# Patient Record
Sex: FEMALE | Race: BLACK OR AFRICAN AMERICAN | Employment: OTHER | URBAN - METROPOLITAN AREA
[De-identification: names, ages, dates, MRNs, and addresses within clinical notes are randomized per-mention and may not be internally consistent; named-entity substitution may affect disease eponyms.]

---

## 2017-06-26 ENCOUNTER — OFFICE VISIT (OUTPATIENT)
Dept: ORTHOPEDIC SURGERY | Age: 42
End: 2017-06-26

## 2017-06-26 VITALS
WEIGHT: 171 LBS | HEART RATE: 101 BPM | SYSTOLIC BLOOD PRESSURE: 120 MMHG | BODY MASS INDEX: 32.28 KG/M2 | HEIGHT: 61 IN | DIASTOLIC BLOOD PRESSURE: 67 MMHG

## 2017-06-26 RX ORDER — TRIAMCINOLONE ACETONIDE 40 MG/ML
60 INJECTION, SUSPENSION INTRA-ARTICULAR; INTRAMUSCULAR ONCE
Qty: 2 ML | Refills: 0 | Status: CANCELLED
Start: 2017-06-26 | End: 2017-06-26

## 2017-06-26 RX ORDER — LIDOCAINE HYDROCHLORIDE 10 MG/ML
4 INJECTION INFILTRATION; PERINEURAL ONCE
Qty: 6 ML | Refills: 0 | Status: CANCELLED
Start: 2017-06-26 | End: 2017-06-26

## 2017-06-26 NOTE — PROGRESS NOTES
Patient: Jannetta Dancer                MRN: [id number]       SSN: [id number]  YOB: 1975        AGE: 46 y.o. SEX: female  There is no height or weight on file to calculate BMI. PCP: Delmis Leo NP  06/26/17      No chief complaint on file. HISTORY OF PRESENT ILLNESS: Jannetta Dancer is a 46 y.o. female who presents to the office for ***. Review of Systems   Constitutional: Negative. HENT: Negative. Eyes: Negative. Respiratory: Negative. Cardiovascular: Negative. Gastrointestinal: Negative. Genitourinary: Negative. Musculoskeletal: Positive for joint pain (left shoulder). Skin: Negative. Neurological: Negative. Endo/Heme/Allergies: Negative. Psychiatric/Behavioral: Negative.             Social History     Social History    Marital status: SINGLE     Spouse name: N/A    Number of children: N/A    Years of education: N/A     Occupational History          Social History Main Topics    Smoking status: Current Every Day Smoker     Packs/day: 1.00     Years: 15.00     Types: Cigarettes     Start date: 1/1/1992    Smokeless tobacco: Never Used    Alcohol use 1.8 - 2.4 oz/week     3 - 4 Glasses of wine per week      Comment: 3 to 5 glasses wine weekly    Drug use: No    Sexual activity: Yes     Partners: Female     Other Topics Concern     Service No    Blood Transfusions No    Caffeine Concern No    Occupational Exposure Yes        [facility] Hazards No    Sleep Concern Yes     has not been able to sleep lately, reports 3 hrs of sleep at night    Stress Concern Yes    Weight Concern Yes     has recently gained weight    Special Diet No    Back Care No    Exercise No    Seat Belt Yes    Self-Exams Yes     Social History Narrative        Past Medical History:   Diagnosis Date    Anemia     Asthma     Asthma     Bilateral ovarian cysts     Depression     HA (headache)     Heart murmur     Hypertension  MS (multiple sclerosis) (HCC)     Multiple sclerosis (HCC)     Wheezing         Allergies   Allergen Reactions    Penicillins Swelling     Other reaction(s): anaphylaxis/angioedema, wheezing/sob         Current Outpatient Prescriptions   Medication Sig    ibuprofen (MOTRIN) 200 mg tablet Take  by mouth.  albuterol (PROVENTIL HFA, VENTOLIN HFA, PROAIR HFA) 90 mcg/actuation inhaler Take  by inhalation.  diclofenac (VOLTAREN) 1 % gel Apply  to affected area four (4) times daily.  METHYL SALICYLATE/MENTHOL (BENGAY EX) by Apply Externally route.  cyclobenzaprine (FLEXERIL) 10 mg tablet Take 1 Tab by mouth three (3) times daily as needed for Muscle Spasm(s).  ALPRAZolam (XANAX) 0.25 mg tablet Take 1 Tab by mouth two (2) times daily as needed for Anxiety. Max Daily Amount: 0.5 mg. Indications: ANXIETY WITH DEPRESSION, PANIC DISORDER    NAPROXEN SODIUM (ALEVE PO) Take  by mouth.  gabapentin (NEURONTIN) 100 mg capsule Take 1 Cap by mouth three (3) times daily.  carBAMazepine (TEGRETOL) 100 mg chewable tablet     busPIRone (BUSPAR) 10 mg tablet Take 1 Tab by mouth two (2) times a day. Indications: GENERALIZED ANXIETY DISORDER    traMADol (ULTRAM) 50 mg tablet Take 1 Tab by mouth every six (6) hours as needed for Pain. Max Daily Amount: 200 mg.  sertraline (ZOLOFT) 100 mg tablet Take 2 Tabs by mouth daily. Indications: ANXIETY WITH DEPRESSION    albuterol (ACCUNEB) 1.25 mg/3 mL nebulizer solution 1.25 mg.    natalizumab (TYSABRI) 300 mg/15 mL injection 300 mg by IntraVENous route once.  VENTOLIN HFA 90 mcg/actuation inhaler     QVAR 80 mcg/actuation inhaler     ergocalciferol (ERGOCALCIFEROL) 50,000 unit capsule 50,000 Units every seven (7) days. Indications: VITAMIN D DEFICIENCY     No current facility-administered medications for this visit. Physical Exam  Constitutional: she is oriented to person, place, and time and well-developed, well-nourished, and in no distress.  No distress. HENT:   Head: Normocephalic and atraumatic. Right Ear: Hearing normal.   Left Ear: Hearing normal.   Nose: Nose normal.   Eyes: Conjunctivae, EOM and lids are normal. Pupils are equal, round, and reactive to light. Neck: Trachea normal.   Pulmonary/Chest: Effort normal and breath sounds normal. No respiratory distress. Abdominal: Soft. Neurological: she is alert and oriented to person, place, and time. Skin: Skin is warm, dry and intact. she is not diaphoretic. Psychiatric: Affect normal.   Nursing note and vitals reviewed. Ortho Exam   ***    Procedure: After timeout and under sterile conditions, patient's left shoulder was injected with 4 cc of Xylocaine and 1.5 cc of Kenalog. [facility] - [address] [place] [address]  OFFICE PROCEDURE PROGRESS NOTE        Chart reviewed for the following:   Demetris Thomas MD, have reviewed the History, Physical and updated the Allergic reactions for [address] performed immediately prior to start of procedure:   Demetris Thomas MD, have performed the following reviews on Lexi Nunez prior to the start of the procedure:            * Patient was identified by name and date of birth   * Agreement on procedure being performed was verified  * Risks and Benefits explained to the patient  * Procedure site verified and marked as necessary  * Patient was positioned for comfort  * Consent was signed and verified     Time: 2:20 PM        Date of procedure: 6/26/2017    Procedure performed by: Camilo Bahena MD    Provider assisted by: None     Patient assisted by: self    How tolerated by patient: tolerated the procedure well with no complications    Comments: none      RADIOGRAPHS:   ***    IMPRESSION & PLAN: ***. I will see her back ***. Written by Rody Pace, as dictated by Dr. Deisi Cota, Dr. Camilo Bahena, confirm that all documentation is accurate.

## 2017-07-26 ENCOUNTER — OFFICE VISIT (OUTPATIENT)
Dept: ORTHOPEDIC SURGERY | Age: 42
End: 2017-07-26

## 2017-07-26 VITALS
BODY MASS INDEX: 32.28 KG/M2 | TEMPERATURE: 98.6 F | SYSTOLIC BLOOD PRESSURE: 129 MMHG | WEIGHT: 171 LBS | DIASTOLIC BLOOD PRESSURE: 94 MMHG | HEIGHT: 61 IN | HEART RATE: 84 BPM | RESPIRATION RATE: 18 BRPM | OXYGEN SATURATION: 99 %

## 2017-07-26 DIAGNOSIS — M75.42 SHOULDER IMPINGEMENT SYNDROME, LEFT: ICD-10-CM

## 2017-07-26 DIAGNOSIS — M25.512 CHRONIC LEFT SHOULDER PAIN: Primary | ICD-10-CM

## 2017-07-26 DIAGNOSIS — G89.29 CHRONIC LEFT SHOULDER PAIN: Primary | ICD-10-CM

## 2017-07-26 RX ORDER — TRIAMCINOLONE ACETONIDE 40 MG/ML
40 INJECTION, SUSPENSION INTRA-ARTICULAR; INTRAMUSCULAR ONCE
Qty: 1 ML | Refills: 0
Start: 2017-07-26 | End: 2017-07-26

## 2017-07-26 NOTE — PATIENT INSTRUCTIONS
Joint Pain: Care Instructions  Your Care Instructions  Many people have small aches and pains from overuse or injury to muscles and joints. Joint injuries often happen during sports or recreation, work tasks, or projects around the home. An overuse injury can happen when you put too much stress on a joint or when you do an activity that stresses the joint over and over, such as using the computer or rowing a boat. You can take action at home to help your muscles and joints get better. You should feel better in 1 to 2 weeks, but it can take 3 months or more to heal completely. Follow-up care is a key part of your treatment and safety. Be sure to make and go to all appointments, and call your doctor if you are having problems. It's also a good idea to know your test results and keep a list of the medicines you take. How can you care for yourself at home? · Do not put weight on the injured joint for at least a day or two. · For the first day or two after an injury, do not take hot showers or baths, and do not use hot packs. The heat could make swelling worse. · Put ice or a cold pack on the sore joint for 10 to 20 minutes at a time. Try to do this every 1 to 2 hours for the next 3 days (when you are awake) or until the swelling goes down. Put a thin cloth between the ice and your skin. · Wrap the injury in an elastic bandage. Do not wrap it too tightly because this can cause more swelling. · Prop up the sore joint on a pillow when you ice it or anytime you sit or lie down during the next 3 days. Try to keep it above the level of your heart. This will help reduce swelling. · Take an over-the-counter pain medicine, such as acetaminophen (Tylenol), ibuprofen (Advil, Motrin), or naproxen (Aleve). Read and follow all instructions on the label. · After 1 or 2 days of rest, begin moving the joint gently.  While the joint is still healing, you can begin to exercise using activities that do not strain or hurt the painful joint. When should you call for help? Call your doctor now or seek immediate medical care if:  · You have signs of infection, such as:  ¨ Increased pain, swelling, warmth, and redness. ¨ Red streaks leading from the joint. ¨ A fever. Watch closely for changes in your health, and be sure to contact your doctor if:  · Your movement or symptoms are not getting better after 1 to 2 weeks of home treatment. Where can you learn more? Go to http://emiliana-robby.info/. Enter P205 in the search box to learn more about \"Joint Pain: Care Instructions. \"  Current as of: March 21, 2017  Content Version: 11.3  © 8629-1186 [employer]. Care instructions adapted under license by [employer] (which disclaims liability or warranty for this information). If you have questions about a medical condition or this instruction, always ask your healthcare professional. [address] any warranty or liability for your use of this information. Statement Selected
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
## 2018-02-22 ENCOUNTER — OFFICE VISIT (OUTPATIENT)
Dept: ORTHOPEDIC SURGERY | Age: 43
End: 2018-02-22

## 2018-02-22 VITALS — HEART RATE: 73 BPM | SYSTOLIC BLOOD PRESSURE: 139 MMHG | TEMPERATURE: 98.6 F | DIASTOLIC BLOOD PRESSURE: 80 MMHG

## 2018-02-22 DIAGNOSIS — M75.42 SHOULDER IMPINGEMENT SYNDROME, LEFT: Primary | ICD-10-CM

## 2018-02-22 RX ORDER — TIZANIDINE HYDROCHLORIDE 2 MG/1
2 CAPSULE, GELATIN COATED ORAL 3 TIMES DAILY
COMMUNITY

## 2018-02-22 RX ORDER — NITROFURANTOIN 25; 75 MG/1; MG/1
100 CAPSULE ORAL 2 TIMES DAILY
COMMUNITY
End: 2022-05-27

## 2018-02-22 RX ORDER — TRIAMCINOLONE ACETONIDE 40 MG/ML
40 INJECTION, SUSPENSION INTRA-ARTICULAR; INTRAMUSCULAR ONCE
Qty: 1 ML | Refills: 0
Start: 2018-02-22 | End: 2018-02-22

## 2018-02-22 NOTE — PROGRESS NOTES
Charity Covarrubias  1975   Chief Complaint   Patient presents with    Shoulder Pain     Left        HISTORY OF PRESENT ILLNESS  Charity Covarrubias is a 38 y.o. female who presents today for reevaluation of left shoulder pain. Patient rates pain as 7/10 today. At last OV in July 2017, patient had a cortisone injection which provided good relief. Her left shoulder pain has been gradually returning. It is worse with certain movements of the arm, especially overhead and behind herself. Patient denies any fever, chills, chest pain, shortness of breath or calf pain. There are no new illness or injuries to report since last seen in the office. There are no changes to medications, allergies, family or social history. PHYSICAL EXAM:   Visit Vitals    /80 (BP 1 Location: Left arm, BP Patient Position: Sitting)    Pulse 73    Temp 98.6 °F (37 °C) (Oral)     The patient is a well-developed, well-nourished female   in no acute distress. The patient is alert and oriented times three. The patient is alert and oriented times three. Mood and affect are normal.  LYMPHATIC: lymph nodes are not enlarged and are within normal limits  SKIN: normal in color and non tender to palpation. There are no bruises or abrasions noted. NEUROLOGICAL: Motor sensory exam is within normal limits. Reflexes are equal bilaterally.  There is normal sensation to pinprick and light touch  MUSCULOSKELETAL:  Examination Left shoulder   Skin Intact   AC joint tenderness -   Biceps tenderness -   Forward flexion/Elevation    Active abduction    Glenohumeral abduction 90   External rotation ROM 90   Internal rotation ROM 70   Apprehension -   Anias Relocation -   Jerk -   Load and Shift -   Obriens -   Speeds -   Impingement sign +   Supraspinatus/Empty Can -, 5/5   External Rotation Strength -, 5/5   Lift Off/Belly Press -, 5/5   Neurovascular Intact        PROCEDURE: After sterile prep, 6 cc of Xylocaine and 1 cc of Kenalog were injected into the left shoulder. [facility] - [facility]  OFFICE PROCEDURE PROGRESS NOTE        Chart reviewed for the following:  Wilfred Perkins MD, have reviewed the History, Physical and updated the Allergic reactions for [address] performed immediately prior to start of procedure:  Wilfred Perkins MD, have performed the following reviews on Adam Gamble prior to the start of the procedure:            * Patient was identified by name and date of birth   * Agreement on procedure being performed was verified  * Risks and Benefits explained to the patient  * Procedure site verified and marked as necessary  * Patient was positioned for comfort  * Consent was signed and verified     Time: 8:35 AM    Date of procedure: 2/22/2018    Procedure performed by:  Zane Thurston MD    Provider assisted by: (see medication administration)    How tolerated by patient: tolerated the procedure well with no complications    Comments: none      IMAGING: XR of the left shoulder dated 7/26/17 was reviewed and read:   IMPRESSION:  Negative left shoulder.      MRI of the left shoulder dated 10/29/15 was reviewed and read:   IMPRESSION:  1.  Moderate supraspinatus and mild infraspinatus insertional tendinosis.  No rotator cuff tear. 2.  Moderate intra-articular long biceps tendinosis. 3.  Minimal degenerative osteoarthropathy of the left acromioclavicular joint, without morphology or secondary findings of subacromial impingement. 4.  Variant Cassandra labral morphology.  No labral tear. IMPRESSION:      ICD-10-CM ICD-9-CM    1. Shoulder impingement syndrome, left M75.42 726.2 TRIAMCINOLONE ACETONIDE INJ      triamcinolone acetonide (KENALOG) 40 mg/mL injection      DRAIN/INJECT LARGE JOINT/BURSA        PLAN:   1. Patient's left shoulder pain has been gradually returning.  If she continues to have long-lasting relief from the injections I am happy to keep doing them. If the pain returns sooner, we may have to try more aggressive treatment. Risk factors include: htn, anemia  2. Yes cortisone injection indicated today L SHOULDER  3. No Physical/Occupational Therapy indicated today  4. No diagnostic test indicated today  5. No durable medical equipment indicated today  6. No referral indicated today   7. No medications indicated today  8. No Narcotic indicated today       RTC prn  Follow-up Disposition: Not on File    Scribed by Da Wild [facility]) as dictated by Rohini Kunz MD    I, Dr. Rohini Kunz, confirm that all documentation is accurate.     Rohini Kunz M.D.   [facility]

## 2018-03-21 ENCOUNTER — TELEPHONE (OUTPATIENT)
Dept: ORTHOPEDIC SURGERY | Age: 43
End: 2018-03-21

## 2018-03-21 DIAGNOSIS — M25.512 LEFT SHOULDER PAIN, UNSPECIFIED CHRONICITY: Primary | ICD-10-CM

## 2018-03-21 NOTE — TELEPHONE ENCOUNTER
Patient is calling to inform Dr. Debbie Quintana the lt shldr injection done 02/22 she has not gotten any relief. Please advise her as to what is the next step to help relieve her severe pain.  Please call her back at [phone]

## 2018-03-27 ENCOUNTER — DOCUMENTATION ONLY (OUTPATIENT)
Dept: ORTHOPEDIC SURGERY | Age: 43
End: 2018-03-27

## 2018-04-09 ENCOUNTER — DOCUMENTATION ONLY (OUTPATIENT)
Dept: ORTHOPEDIC SURGERY | Age: 43
End: 2018-04-09

## 2018-05-01 ENCOUNTER — OFFICE VISIT (OUTPATIENT)
Dept: ORTHOPEDIC SURGERY | Age: 43
End: 2018-05-01

## 2018-05-01 VITALS
DIASTOLIC BLOOD PRESSURE: 85 MMHG | SYSTOLIC BLOOD PRESSURE: 145 MMHG | HEIGHT: 61 IN | WEIGHT: 179.6 LBS | TEMPERATURE: 98 F | BODY MASS INDEX: 33.91 KG/M2 | HEART RATE: 71 BPM | OXYGEN SATURATION: 99 % | RESPIRATION RATE: 16 BRPM

## 2018-05-01 DIAGNOSIS — M67.922 BICEPS TENDINOPATHY, LEFT: ICD-10-CM

## 2018-05-01 DIAGNOSIS — M75.52 SUBACROMIAL BURSITIS OF LEFT SHOULDER JOINT: Primary | ICD-10-CM

## 2018-05-01 RX ORDER — TRIAMCINOLONE ACETONIDE 40 MG/ML
40 INJECTION, SUSPENSION INTRA-ARTICULAR; INTRAMUSCULAR ONCE
Qty: 1 ML | Refills: 0
Start: 2018-05-01 | End: 2018-05-01

## 2018-05-01 NOTE — PROGRESS NOTES
Tiffany Arias  1975   Chief Complaint   Patient presents with    Shoulder Pain     L SHOULDER PAIN         HISTORY OF PRESENT ILLNESS  Tiffany Arias is a 38 y.o. female who presents today for reevaluation of left shoulder pain and to review MRI results. Patient rates pain as 5/10 today. At last OV, patient had a cortisone injection which provided limited relief. Patient reports that the last injection did not help much. The pain has been gradually returning. It is worse with certain movements of the arm, especially overhead and behind herself. Patient denies any fever, chills, chest pain, shortness of breath or calf pain. There are no new illness or injuries to report since last seen in the office. There are no changes to medications, allergies, family or social history. PHYSICAL EXAM:   Visit Vitals    /85    Pulse 71    Temp 98 °F (36.7 °C) (Oral)    Resp 16    Ht 5' 1\" (1.549 m)    Wt 179 lb 9.6 oz (81.5 kg)    SpO2 99%    BMI 33.94 kg/m2     The patient is a well-developed, well-nourished female   in no acute distress. The patient is alert and oriented times three. The patient is alert and oriented times three. Mood and affect are normal.  LYMPHATIC: lymph nodes are not enlarged and are within normal limits  SKIN: normal in color and non tender to palpation. There are no bruises or abrasions noted. NEUROLOGICAL: Motor sensory exam is within normal limits. Reflexes are equal bilaterally.  There is normal sensation to pinprick and light touch  MUSCULOSKELETAL:  Examination Left shoulder   Skin Intact   AC joint tenderness -   Biceps tenderness +   Forward flexion/Elevation    Active abduction    Glenohumeral abduction 90   External rotation ROM 90   Internal rotation ROM 70   Apprehension -   Anias Relocation -   Jerk -   Load and Shift -   Obriens -   Speeds -   Impingement sign +   Supraspinatus/Empty Can -, 5/5   External Rotation Strength -, 5/5   Lift Off/Belly Press -, 5/5   Neurovascular Intact        PROCEDURE: Left biceps tendon Injection with Ultrasound Guidance      After sterile prep, 6 cc of Xylocaine and 1 cc of Kenalog were injected into the left biceps tendon. Ultrasound images captured using [address] Ultrasound machine and scanned into patient's chart. [facility]  OFFICE PROCEDURE PROGRESS NOTE        Chart reviewed for the following:  Lul Echevarria M.D, have reviewed the History, Physical and updated the Allergic reactions for [address] performed immediately prior to start of procedure:  Lul Echevarria M.D, have performed the following reviews on Rubio Aguilar prior to the start of the procedure:            * Patient was identified by name and date of birth   * Agreement on procedure being performed was verified  * Risks and Benefits explained to the patient  * Procedure site verified and marked as necessary  * Patient was positioned for comfort  * Consent was signed and verified     Time: 9:30 AM     Date of procedure: 5/1/2018    Procedure performed by:  Tali Farrell M.D    Provider assisted by: (see medication administration)    How tolerated by patient: tolerated the procedure well with no complications    Comments: none    visuallizing the biceps tendon with the ultrasound, it appeared to be subluxed in the bicipital groove. IMAGING:   US of the left shoulder dated 5/1/18 was reviewed and read:   visualizing the biceps tendon with the ultrasound, it appeared to be subluxed in the bicipital groove    MRI of the left shoulder dated 4/28/18 was reviewed and read:   IMPRESSION:  1.  Mild to moderate rotator cuff tendinopathy, without tear. 2. Long head biceps tenosynovitis, without tear or advanced tendinopathy. 3. Subacromial/subdeltoid bursitis.     XR of the left shoulder dated 7/26/17 was reviewed and read:   IMPRESSION:  Negative left shoulder.      MRI of the left shoulder dated 10/29/15 was reviewed and read:   IMPRESSION:  1.  Moderate supraspinatus and mild infraspinatus insertional tendinosis.  No rotator cuff tear. 2.  Moderate intra-articular long biceps tendinosis. 3.  Minimal degenerative osteoarthropathy of the left acromioclavicular joint, without morphology or secondary findings of subacromial impingement. 4.  Variant Jose Ramon labral morphology.  No labral tear. IMPRESSION:      ICD-10-CM ICD-9-CM    1. Subacromial bursitis of left shoulder joint M75.52 726.19 TRIAMCINOLONE ACETONIDE INJ      triamcinolone acetonide (KENALOG) 40 mg/mL injection      US GUIDE INJ/ASP/ARTHRO LG JNT/BURSA      INJECT TENDON SHEATH/LIGAMENT   2. Biceps tendinopathy, left M67.922 727.9 INJECT TENDON SHEATH/LIGAMENT        PLAN:   1. I discussed the results of the MRI and the treatment options with the patient. Patient's left shoulder pain persists. Risk factors include: htn, anemia  2. Yes cortisone injection indicated today L BICEPS TENDON, US  3. No Physical/Occupational Therapy indicated today  4. No diagnostic test indicated today  5. No durable medical equipment indicated today  6. No referral indicated today   7. No medications indicated today  8. No Narcotic indicated today       RTC 4 weeks if pain continues  Follow-up Disposition: Not on File    Scribed by [facility]) as dictated by Ralph Maier MD    I, Dr. Ralph Maier, confirm that all documentation is accurate.     Ralph Maier M.D.   [facility]

## 2018-05-07 ENCOUNTER — TELEPHONE (OUTPATIENT)
Dept: ORTHOPEDIC SURGERY | Age: 43
End: 2018-05-07

## 2018-05-18 DIAGNOSIS — M25.512 LEFT SHOULDER PAIN, UNSPECIFIED CHRONICITY: ICD-10-CM

## 2018-07-10 ENCOUNTER — OFFICE VISIT (OUTPATIENT)
Dept: ORTHOPEDIC SURGERY | Age: 43
End: 2018-07-10

## 2018-07-10 VITALS
DIASTOLIC BLOOD PRESSURE: 74 MMHG | HEART RATE: 98 BPM | BODY MASS INDEX: 33.79 KG/M2 | TEMPERATURE: 97.5 F | RESPIRATION RATE: 16 BRPM | HEIGHT: 61 IN | WEIGHT: 179 LBS | SYSTOLIC BLOOD PRESSURE: 126 MMHG | OXYGEN SATURATION: 100 %

## 2018-07-10 DIAGNOSIS — M75.52 SUBACROMIAL BURSITIS OF LEFT SHOULDER JOINT: Primary | ICD-10-CM

## 2018-07-10 DIAGNOSIS — M67.922 BICEPS TENDINOPATHY, LEFT: ICD-10-CM

## 2018-07-10 RX ORDER — MELOXICAM 15 MG/1
15 TABLET ORAL
Qty: 30 TAB | Refills: 0 | Status: SHIPPED | OUTPATIENT
Start: 2018-07-10 | End: 2022-05-27

## 2018-07-10 NOTE — PROGRESS NOTES
Chris Mccord  1975   Chief Complaint   Patient presents with    Shoulder Pain     left shoulder f/u         HISTORY OF PRESENT ILLNESS  Chris Mccord is a 38 y.o. female who presents today for reevaluation of left shoulder pain. Patient rates pain as 9/10 today. Patient works as a . At last OV, patient underwent cortisone injection to the left shoulder which provided temporary relief x 2 months. Patient reports that the last injection did not help much. The pain has been gradually returning. It is worse with certain movements of the arm, especially overhead and behind herself. Patient denies any fever, chills, chest pain, shortness of breath or calf pain. There are no new illness or injuries to report since last seen in the office. There are no changes to medications, allergies, family or social history. PHYSICAL EXAM:   Visit Vitals    /74    Pulse 98    Temp 97.5 °F (36.4 °C) (Oral)    Resp 16    Ht 5' 1\" (1.549 m)    Wt 179 lb (81.2 kg)    SpO2 100%    BMI 33.82 kg/m2     The patient is a well-developed, well-nourished female   in no acute distress. The patient is alert and oriented times three. The patient is alert and oriented times three. Mood and affect are normal.  LYMPHATIC: lymph nodes are not enlarged and are within normal limits  SKIN: normal in color and non tender to palpation. There are no bruises or abrasions noted. NEUROLOGICAL: Motor sensory exam is within normal limits. Reflexes are equal bilaterally.  There is normal sensation to pinprick and light touch  MUSCULOSKELETAL:  Examination Left shoulder   Skin Intact   AC joint tenderness -   Biceps tenderness +   Forward flexion/Elevation    Active abduction    Glenohumeral abduction 90   External rotation ROM 90   Internal rotation ROM 70   Apprehension -   Anias Relocation -   Jerk -   Load and Shift -   Obriens -   Speeds -   Impingement sign +   Supraspinatus/Empty Can -, 5/5   External Rotation Strength -, 5/5   Lift Off/Belly Press -, 5/5   Neurovascular Intact        IMAGING:   US of the left shoulder dated 5/1/18 was reviewed and read:   visualizing the biceps tendon with the ultrasound, it appeared to be subluxed in the bicipital groove    MRI of the left shoulder dated 4/28/18 was reviewed and read:   IMPRESSION:  1.  Mild to moderate rotator cuff tendinopathy, without tear. 2. Long head biceps tenosynovitis, without tear or advanced tendinopathy. 3. Subacromial/subdeltoid bursitis. XR of the left shoulder dated 7/26/17 was reviewed and read:   IMPRESSION:  Negative left shoulder.      MRI of the left shoulder dated 10/29/15 was reviewed and read:   IMPRESSION:  1.  Moderate supraspinatus and mild infraspinatus insertional tendinosis.  No rotator cuff tear. 2.  Moderate intra-articular long biceps tendinosis. 3.  Minimal degenerative osteoarthropathy of the left acromioclavicular joint, without morphology or secondary findings of subacromial impingement. 4.  Variant Jose Ramon labral morphology.  No labral tear. IMPRESSION:      ICD-10-CM ICD-9-CM    1. Subacromial bursitis of left shoulder joint M75.52 726.19 REFERRAL TO PHYSICAL THERAPY      meloxicam (MOBIC) 15 mg tablet   2. Biceps tendinopathy, left M67.922 727.9 REFERRAL TO PHYSICAL THERAPY      meloxicam (MOBIC) 15 mg tablet        PLAN:   1. I discussed treatment options with the patient. Patient's left shoulder pain persists. She gains transient relief with cortisone injections to her left shoulder. I recommend proceeding with physical therapy and NSAIDs. Surgical intervention was discussed. Risk factors include: htn, anemia  2. No cortisone injection indicated today  3. Yes Physical Therapy indicated today: LT shoulder  4. No diagnostic test indicated today  5. No durable medical equipment indicated today  6. No referral indicated today   7. Yes medications indicated today: MOBIC  8.  No Narcotic indicated today       RTC 4 weeks  Follow-up Disposition: Not on File    Scribed by Ellen Craven [address]) as dictated by Bobbi Medina MD    I, Dr. Bobbi Medina, confirm that all documentation is accurate.     Bobbi Medina M.D.   [facility]

## 2021-10-20 ENCOUNTER — APPOINTMENT (OUTPATIENT)
Dept: CT IMAGING | Age: 46
End: 2021-10-20
Attending: EMERGENCY MEDICINE
Payer: COMMERCIAL

## 2021-10-20 ENCOUNTER — HOSPITAL ENCOUNTER (EMERGENCY)
Age: 46
Discharge: HOME | End: 2021-10-21
Attending: EMERGENCY MEDICINE
Payer: COMMERCIAL
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
## 2021-10-20 DIAGNOSIS — S06.0X0A CONCUSSION WITHOUT LOSS OF CONSCIOUSNESS, INITIAL ENCOUNTER: ICD-10-CM

## 2021-10-20 DIAGNOSIS — M79.10 MYALGIA: Primary | ICD-10-CM

## 2021-10-20 DIAGNOSIS — S09.90XA INJURY OF HEAD, INITIAL ENCOUNTER: ICD-10-CM

## 2021-10-20 LAB
ANION GAP SERPL CALC-SCNC: 6 MMOL/L (ref 3–18)
APPEARANCE UR: CLEAR
BACTERIA URNS QL MICRO: ABNORMAL /HPF
BASOPHILS # BLD: 0 K/UL (ref 0–0.1)
BASOPHILS NFR BLD: 0 % (ref 0–2)
BILIRUB UR QL: NEGATIVE
BUN SERPL-MCNC: 9 MG/DL (ref 7–18)
BUN/CREAT SERPL: 11 (ref 12–20)
CALCIUM SERPL-MCNC: 8.4 MG/DL (ref 8.5–10.1)
CHLORIDE SERPL-SCNC: 109 MMOL/L (ref 100–111)
CO2 SERPL-SCNC: 28 MMOL/L (ref 21–32)
COLOR UR: YELLOW
CREAT SERPL-MCNC: 0.79 MG/DL (ref 0.6–1.3)
DIFFERENTIAL METHOD BLD: ABNORMAL
EOSINOPHIL # BLD: 0.8 K/UL (ref 0–0.4)
EOSINOPHIL NFR BLD: 9 % (ref 0–5)
EPITH CASTS URNS QL MICRO: ABNORMAL /LPF (ref 0–5)
ERYTHROCYTE [DISTWIDTH] IN BLOOD BY AUTOMATED COUNT: 15.1 % (ref 11.6–14.5)
GLUCOSE SERPL-MCNC: 117 MG/DL (ref 74–99)
GLUCOSE UR STRIP.AUTO-MCNC: NEGATIVE MG/DL
HCG SERPL QL: NEGATIVE
HCT VFR BLD AUTO: 39.6 % (ref 35–45)
HGB BLD-MCNC: 13.3 G/DL (ref 12–16)
HGB UR QL STRIP: ABNORMAL
KETONES UR QL STRIP.AUTO: ABNORMAL MG/DL
LEUKOCYTE ESTERASE UR QL STRIP.AUTO: NEGATIVE
LYMPHOCYTES # BLD: 1.1 K/UL (ref 0.9–3.6)
LYMPHOCYTES NFR BLD: 12 % (ref 21–52)
MCH RBC QN AUTO: 28 PG (ref 24–34)
MCHC RBC AUTO-ENTMCNC: 33.6 G/DL (ref 31–37)
MCV RBC AUTO: 83.4 FL (ref 78–100)
MONOCYTES # BLD: 0.5 K/UL (ref 0.05–1.2)
MONOCYTES NFR BLD: 6 % (ref 3–10)
MUCOUS THREADS URNS QL MICRO: ABNORMAL /LPF
NEUTS SEG # BLD: 6.6 K/UL (ref 1.8–8)
NEUTS SEG NFR BLD: 73 % (ref 40–73)
NITRITE UR QL STRIP.AUTO: NEGATIVE
PH UR STRIP: 5 [PH] (ref 5–8)
PLATELET # BLD AUTO: 293 K/UL (ref 135–420)
PMV BLD AUTO: 9 FL (ref 9.2–11.8)
POTASSIUM SERPL-SCNC: 3.5 MMOL/L (ref 3.5–5.5)
PROT UR STRIP-MCNC: ABNORMAL MG/DL
RBC # BLD AUTO: 4.75 M/UL (ref 4.2–5.3)
RBC #/AREA URNS HPF: ABNORMAL /HPF (ref 0–5)
SODIUM SERPL-SCNC: 143 MMOL/L (ref 136–145)
SP GR UR REFRACTOMETRY: >1.03 (ref 1–1.03)
UROBILINOGEN UR QL STRIP.AUTO: 1 EU/DL (ref 0.2–1)
WBC # BLD AUTO: 9.1 K/UL (ref 4.6–13.2)
WBC URNS QL MICRO: ABNORMAL /HPF (ref 0–4)

## 2021-10-20 PROCEDURE — 70450 CT HEAD/BRAIN W/O DYE: CPT

## 2021-10-20 PROCEDURE — 85025 COMPLETE CBC W/AUTO DIFF WBC: CPT

## 2021-10-20 PROCEDURE — 80048 BASIC METABOLIC PNL TOTAL CA: CPT

## 2021-10-20 PROCEDURE — 84703 CHORIONIC GONADOTROPIN ASSAY: CPT

## 2021-10-20 PROCEDURE — 99284 EMERGENCY DEPT VISIT MOD MDM: CPT

## 2021-10-20 PROCEDURE — 74011000636 HC RX REV CODE- 636: Performed by: EMERGENCY MEDICINE

## 2021-10-20 PROCEDURE — 81001 URINALYSIS AUTO W/SCOPE: CPT

## 2021-10-20 PROCEDURE — 72125 CT NECK SPINE W/O DYE: CPT

## 2021-10-20 PROCEDURE — 74177 CT ABD & PELVIS W/CONTRAST: CPT

## 2021-10-20 RX ORDER — MORPHINE SULFATE 4 MG/ML
4 INJECTION INTRAVENOUS ONCE
Status: DISCONTINUED | OUTPATIENT
Start: 2021-10-20 | End: 2021-10-21

## 2021-10-20 RX ORDER — ONDANSETRON 2 MG/ML
4 INJECTION INTRAMUSCULAR; INTRAVENOUS ONCE
Status: COMPLETED | OUTPATIENT
Start: 2021-10-20 | End: 2021-10-21

## 2021-10-20 NOTE — Clinical Note
[address] [facility] EMERGENCY DEPT  [id number] [address] [phone]    Work/School Note    Date: 10/20/2021    To Whom It May concern:    Ragini Avila was seen and treated today in the emergency room by the following provider(s):  Attending Provider: Larry Torres MD.      Ragini Avila is excused from work/school on 10/21/2021 through 10/24/2021. She is medically clear to return to work/school on 10/25/2021.         Sincerely,          Roman Anderson MD

## 2021-10-21 VITALS
RESPIRATION RATE: 16 BRPM | DIASTOLIC BLOOD PRESSURE: 80 MMHG | BODY MASS INDEX: 34.01 KG/M2 | OXYGEN SATURATION: 99 % | SYSTOLIC BLOOD PRESSURE: 148 MMHG | TEMPERATURE: 97.6 F | WEIGHT: 180 LBS | HEART RATE: 87 BPM

## 2021-10-21 PROCEDURE — 74011250636 HC RX REV CODE- 250/636

## 2021-10-21 PROCEDURE — 96374 THER/PROPH/DIAG INJ IV PUSH: CPT

## 2021-10-21 PROCEDURE — 96375 TX/PRO/DX INJ NEW DRUG ADDON: CPT

## 2021-10-21 PROCEDURE — 74011250636 HC RX REV CODE- 250/636: Performed by: EMERGENCY MEDICINE

## 2021-10-21 PROCEDURE — 74011000636 HC RX REV CODE- 636: Performed by: EMERGENCY MEDICINE

## 2021-10-21 RX ORDER — IBUPROFEN 600 MG/1
600 TABLET ORAL
Qty: 20 TABLET | Refills: 0 | Status: SHIPPED | OUTPATIENT
Start: 2021-10-21 | End: 2022-05-27

## 2021-10-21 RX ORDER — ONDANSETRON 4 MG/1
TABLET, ORALLY DISINTEGRATING ORAL
Qty: 10 TABLET | Refills: 0 | Status: SHIPPED | OUTPATIENT
Start: 2021-10-21

## 2021-10-21 RX ORDER — KETOROLAC TROMETHAMINE 15 MG/ML
15 INJECTION, SOLUTION INTRAMUSCULAR; INTRAVENOUS ONCE
Status: COMPLETED | OUTPATIENT
Start: 2021-10-21 | End: 2021-10-21

## 2021-10-21 RX ORDER — DIAZEPAM 10 MG/1
10 TABLET ORAL
Qty: 9 TABLET | Refills: 0 | Status: SHIPPED | OUTPATIENT
Start: 2021-10-21 | End: 2021-10-24

## 2021-10-21 RX ORDER — KETOROLAC TROMETHAMINE 15 MG/ML
INJECTION, SOLUTION INTRAMUSCULAR; INTRAVENOUS
Status: COMPLETED
Start: 2021-10-21 | End: 2021-10-21

## 2021-10-21 RX ADMIN — IOPAMIDOL 100 ML: 612 INJECTION, SOLUTION INTRAVENOUS at 00:10

## 2021-10-21 RX ADMIN — KETOROLAC TROMETHAMINE 15 MG: 15 INJECTION, SOLUTION INTRAMUSCULAR; INTRAVENOUS at 02:09

## 2021-10-21 RX ADMIN — ONDANSETRON 4 MG: 2 INJECTION INTRAMUSCULAR; INTRAVENOUS at 02:09
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
## 2021-10-21 NOTE — PROGRESS NOTES
Substitution Information per the P&T Committee approved Therapeutic Interchanges Policy    Nonformulary Medication Formulary Interchange   ketorolac (TORADOL) inj 30 mg Ketorolac (TORADOL) inj 15 mg      Swetha Hussein [facility] Pharmacist  10/21/2021 1:58 AM

## 2022-03-24 ENCOUNTER — HOSPITAL ENCOUNTER (OUTPATIENT)
Dept: PHYSICAL THERAPY | Age: 47
Discharge: HOME | End: 2022-03-24
Payer: MEDICARE

## 2022-03-24 PROCEDURE — 97110 THERAPEUTIC EXERCISES: CPT | Performed by: PHYSICAL THERAPIST

## 2022-03-24 PROCEDURE — 97162 PT EVAL MOD COMPLEX 30 MIN: CPT | Performed by: PHYSICAL THERAPIST
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
## 2022-03-29 ENCOUNTER — HOSPITAL ENCOUNTER (OUTPATIENT)
Dept: PHYSICAL THERAPY | Age: 47
Discharge: HOME | End: 2022-03-29
Payer: MEDICARE

## 2022-03-29 PROCEDURE — 97112 NEUROMUSCULAR REEDUCATION: CPT

## 2022-03-29 PROCEDURE — 97110 THERAPEUTIC EXERCISES: CPT

## 2022-03-29 NOTE — PROGRESS NOTES
PT DAILY TREATMENT NOTE     Patient Name: Jimena Rodriguez  Date:3/29/2022  : 1975  [x]  Patient  Verified  Payor: VA MEDICARE / Plan: VA MEDICARE PART A & B / Product Type: Medicare /    In time:716  Out time:810  Total Treatment Time (min): 54  Visit #: 2 of 10    Medicare/BCBS Only   Total Timed Codes (min):  44 1:1 Treatment Time:  44       Treatment Area: Other low back pain [M54.59]  Muscle weakness (generalized) [M62.81]    SUBJECTIVE  Pain Level (0-10 scale): 8  Any medication changes, allergies to medications, adverse drug reactions, diagnosis change, or new procedure performed?: [x] No    [] Yes (see summary sheet for update)  Subjective functional status/changes:   [] No changes reported  Patient reports completion of HEP as prescribed. Patient reports she is scheduled to undergo a D&C 2022 with patient reporting that they are going to complete a biopsy of cells with planned hysterectomy next week. OBJECTIVE    Modality rationale: decrease pain and increase tissue extensibility to improve the patients ability to improve ease with sleep   Min Type Additional Details   10 []  Ice     [x]  heat  []  Ice massage Position: Supine  Location: Lumbar, Post-Tx     24 min Therapeutic Exercise:  [x] See flow sheet : Emphasis placed on improving available lumbopelvic and LE AROM and strength   Rationale: increase ROM and increase strength to improve the patients ability to improve ease with ambulation. 20 min Neuromuscular Re-education:  [x]  See flow sheet : Emphasis placed on improving activation and recruitment of the anterior abdominal and gluteal musculature and improving spinal proprioceptive and kinesthetic awareness   Rationale: increase ROM, increase strength and increase proprioception  to improve the patients ability to improve ease with functional lifting.        With   [] TE   [] TA   [] neuro   [] other: Patient Education: [x] Review HEP    [] Progressed/Changed HEP based on: [] positioning   [] body mechanics   [] transfers   [] heat/ice application    [] other:      Other Objective/Functional Measures: Poor lumbopelvic kinesthetic awareness in supine     Pain Level (0-10 scale) post treatment: 3    ASSESSMENT/Changes in Function: Initiated exercises per plan of care with good tolerance to performance. To place patient on 30 day hold following completion of next scheduled within clinic treatment session 3/31/2022 with patient scheduled to undergo gynecological surgical procedure 4/5/2022. Patient noted to demonstrate poor lumbopelvic kinesthetic awareness with poor recruitment of the anterior abdominal musculature. Patient will continue to benefit from skilled PT services to modify and progress therapeutic interventions, address functional mobility deficits, address ROM deficits, address strength deficits, analyze and address soft tissue restrictions, analyze and cue movement patterns, analyze and modify body mechanics/ergonomics, assess and modify postural abnormalities, address imbalance/dizziness and instruct in home and community integration to attain remaining goals. []  See Plan of Care  []  See progress note/recertification  []  See Discharge Summary         Progress towards goals / Updated goals:    Short Term Goals: To be accomplished in 1-2 treatments:  1.  Patient will become proficient in their HEP and will be compliant in performing that program.  Evaluation:   Patient given a written/illustrated HEP. Current: Met, HEP performance reported as prescribed, 3/29/2022     Long Term Goals: To be accomplished in 10 treatments:  1. Patient's pain level will be 4-5/10 with activity in order to improve patient's ability to perform normal ADLs. Evaluation:  5/10-10/10  2. Patient will demonstrate AROM Lumbar Spine WFL with minimal pain to increase ease of ADLs.   Evaluation:  AROM L/S flex, ext limited 50%, Right and left SB limited 25%, Right rotation limited 25%, Left rotation limited 50%.  Pain with all AROM. 3. Patient will increase FOTO score to 46 to indicate increased functional mobility. Evaluation:  36  4. Patient will report that moderate activities are limited a little in order to increase her functional activity level. Evaluation:  Notes these activities are limited a lot.     PLAN  [x]  Upgrade activities as tolerated     [x]  Continue plan of care  []  Update interventions per flow sheet       []  Discharge due to:_  []  Other:_      Brian King, PT 3/29/2022  6:56 AM    Future Appointments   Date Time Provider Nir Ferreira   3/29/2022  7:15 AM Moni Hedrick, PT [facility]   3/31/2022  8:00 AM Yasmine Resendiz, PT [facility]

## 2022-03-31 ENCOUNTER — TELEPHONE (OUTPATIENT)
Dept: PHYSICAL THERAPY | Age: 47
End: 2022-03-31

## 2022-03-31 ENCOUNTER — HOSPITAL ENCOUNTER (OUTPATIENT)
Dept: PHYSICAL THERAPY | Age: 47
Discharge: HOME | End: 2022-03-31
Payer: MEDICARE

## 2022-03-31 PROCEDURE — 97112 NEUROMUSCULAR REEDUCATION: CPT

## 2022-03-31 PROCEDURE — 97110 THERAPEUTIC EXERCISES: CPT

## 2022-03-31 NOTE — PROGRESS NOTES
PT DAILY TREATMENT NOTE     Patient Name: Sayra Villa  Date:3/31/2022  : 1975  [x]  Patient  Verified  Payor: Janae Galvan / Plan: [place] MEDICARE PART A & B / Product Type: Medicare /    In time:802  Out time:850  Total Treatment Time (min): 48  Visit #: 3 of 10    Medicare/BCBS Only   Total Timed Codes (min):  38 1:1 Treatment Time:  38       Treatment Area: Other low back pain [M54.59]  Muscle weakness (generalized) [M62.81]    SUBJECTIVE  Pain Level (0-10 scale): 10  Any medication changes, allergies to medications, adverse drug reactions, diagnosis change, or new procedure performed?: [x] No    [] Yes (see summary sheet for update)  Subjective functional status/changes:   [] No changes reported  Patient reports significant increase in left hip pain this AM with difficulties with sleep yesterday evening with patient contributing to the left hip and to generalized weakness secondary to chronic uterine bleeding. Patient reports continued planned surgical D&C. OBJECTIVE    Modality rationale: decrease pain and increase tissue extensibility to improve the patients ability to improve ease with sleep   Min Type Additional Details    10 []? Ice     [x]? heat  []? Ice massage Position: Prone   Location: Lumbar, Post-Tx       24 min Therapeutic Exercise:  [x]? See flow sheet : Emphasis placed on improving available lumbopelvic and LE AROM and strength   Rationale: increase ROM and increase strength to improve the patients ability to improve ease with ambulation.    14 min Neuromuscular Re-education:  [x]? See flow sheet : Emphasis placed on improving activation and recruitment of the anterior abdominal and gluteal musculature and improving spinal proprioceptive and kinesthetic awareness   Rationale: increase ROM, increase strength and increase proprioception  to improve the patients ability to improve ease with functional lifting.          With   [] TE   [] TA   [] neuro   [] other: Patient Education: [x] Review HEP    [] Progressed/Changed HEP based on:   [] positioning   [] body mechanics   [] transfers   [] heat/ice application    [] other:      Other Objective/Functional Measures: Partial bridge throughout 25% AROM     Pain Level (0-10 scale) post treatment: 5    ASSESSMENT/Changes in Function: Patient to be placed on 30 day hold secondary to scheduled gynecological surgery 4/5/2022 with patient verbalizing understanding that she is to require written medical clearance for return back to care. Patient continues to subjectively report high symptom irritability/severity but despite symptoms with ability to perform all prescribed exercises. Patient will continue to benefit from skilled PT services to modify and progress therapeutic interventions, address functional mobility deficits, address ROM deficits, address strength deficits, analyze and address soft tissue restrictions, analyze and cue movement patterns, analyze and modify body mechanics/ergonomics, assess and modify postural abnormalities and instruct in home and community integration to attain remaining goals. []  See Plan of Care  []  See progress note/recertification  []  See Discharge Summary         Progress towards goals / Updated goals:    Short Term Goals: To be accomplished in 1-2 treatments:  1.  Patient will become proficient in their HEP and will be compliant in performing that program.  Evaluation:   Patient given a written/illustrated HEP. Current: Met, HEP performance reported as prescribed, 3/29/2022     Long Term Goals: To be accomplished in 10 treatments:  1. Patient's pain level will be 4-5/10 with activity in order to improve patient's ability to perform normal ADLs. Evaluation:  5/10-10/10  Current: Remains, Pain Level = 5/10 - 10/10, 3/31/2022  2. Patient will demonstrate AROM Lumbar Spine WFL with minimal pain to increase ease of ADLs.   Evaluation:  AROM L/S flex, ext limited 50%, Right and left SB limited 25%, Right rotation limited 25%, Left rotation limited 50%.  Pain with all AROM. 3. Patient will increase FOTO score to 46 to indicate increased functional mobility. Evaluation:  36  4. Patient will report that moderate activities are limited a little in order to increase her functional activity level. Evaluation:  Notes these activities are limited a lot.     PLAN  [x]  Upgrade activities as tolerated     [x]  Continue plan of care  []  Update interventions per flow sheet       []  Discharge due to:_  []  Other:_      Maine Pierre, PT 3/31/2022  6:58 AM    Future Appointments   Date Time Provider Nir Ferreira   3/31/2022  8:00 AM Clara Vuong, PT [facility]

## 2022-04-26 ENCOUNTER — TELEPHONE (OUTPATIENT)
Dept: PHYSICAL THERAPY | Age: 47
End: 2022-04-26

## 2022-05-26 NOTE — PROGRESS NOTES
[facility]              [address] [facility]        [place], [address]   [phone] fax    Discharge Summary  Patient name: Roxy Ruffin Start of Care: 3/24/2022   Referral source: Jovi Damon NP : 1975   Medical/Treatment Diagnosis: Other low back pain [M54.59]  Muscle weakness (generalized) [M62.81]  Payor: Aakash Tellez / Plan: [place] & B / Product Type: Medicare /  Onset Date:10/21/2021     Prior Hospitalization: see medical history Provider#: [id number]   Medications: Verified on Patient Summary List    Comorbidities: Anxiety/Panic Disorder, Back Pain, BMI 34, COPD/Emphysema, Depression, Headaches, MS, Sleep Dysfunction, Visual Impairment. Prior Level of Function: Disability for MS.  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 Visits from Start of Care: 3    Missed Visits: 0  Reporting Period : 3/24/2022 to 2022    Summary of Care:  Short Term Goals: To be accomplished in 1-2 treatments:  1.  Patient will become proficient in their HEP and will be compliant in performing that program.  Evaluation:   Patient given a written/illustrated HEP. Current: Met, HEP performance reported as prescribed, 3/29/2022     Long Term Goals: To be accomplished in 10 treatments:  1. Patient's pain level will be 4-5/10 with activity in order to improve patient's ability to perform normal ADLs. Evaluation:  5/10-10/10  Current: Remains, Pain Level = 5/10 - 10/10, 3/31/2022  2. Patient will demonstrate AROM Lumbar Spine WFL with minimal pain to increase ease of ADLs. Evaluation:  AROM L/S flex, ext limited 50%, Right and left SB limited 25%, Right rotation limited 25%, Left rotation limited 50%.  Pain with all AROM. 3. Patient will increase FOTO score to 46 to indicate increased functional mobility. Evaluation:  36  4.  Patient will report that moderate activities are limited a little in order to increase her functional activity level.  Evaluation:  Notes these activities are limited a lot. ASSESSMENT/RECOMMENDATIONS:  [x]Discontinue therapy: []Patient has reached or is progressing toward set goals      [x]Patient has abdicated therapy, she was to have a Gyn surgery and was not cleared to return to therapy. Patient requested discharge. []Due to lack of appreciable progress towards set goals    Ron Lama, PT 5/26/2022 7:03 AM    NOTE TO PHYSICIAN:  Please complete the following and fax to: [facility] at [facility] at [phone]  . Retain this original for your records. If you are unable to process this request in   24 hours, please contact our office.      [] I have read the above report and request that my patient continue therapy with the following changes/special instructions:  [] I have read the above report and request that my patient be discharged from therapy    Physician's Signature:____________Date:_________TIME:________     Jovi Damon NP  ** Signature, Date and Time must be completed for valid certification **

## 2022-06-19 NOTE — TELEPHONE ENCOUNTER
Order placed for MRI of left shoulder. Spoke with patient and informed her that our  will be calling her to set up MRI. Also asked patient to call our office after she has the MRI so we can have her scheduled to follow up. Name band;

## 2022-11-07 ENCOUNTER — HOSPITAL ENCOUNTER (EMERGENCY)
Age: 47
Discharge: HOME | End: 2022-11-07
Attending: EMERGENCY MEDICINE
Payer: MEDICAID

## 2022-11-07 VITALS
OXYGEN SATURATION: 100 % | DIASTOLIC BLOOD PRESSURE: 97 MMHG | TEMPERATURE: 98.1 F | HEIGHT: 60 IN | WEIGHT: 180 LBS | RESPIRATION RATE: 16 BRPM | SYSTOLIC BLOOD PRESSURE: 137 MMHG | BODY MASS INDEX: 35.34 KG/M2 | HEART RATE: 110 BPM
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
## 2022-11-07 DIAGNOSIS — S30.866A: Primary | ICD-10-CM

## 2022-11-07 LAB
APPEARANCE UR: CLEAR
BILIRUB UR QL: NEGATIVE
C TRACH RRNA SPEC QL NAA+PROBE: NEGATIVE
COLOR UR: YELLOW
GLUCOSE UR STRIP.AUTO-MCNC: NEGATIVE MG/DL
HGB UR QL STRIP: NEGATIVE
KETONES UR QL STRIP.AUTO: NEGATIVE MG/DL
LEUKOCYTE ESTERASE UR QL STRIP.AUTO: NEGATIVE
N GONORRHOEA RRNA SPEC QL NAA+PROBE: NEGATIVE
NITRITE UR QL STRIP.AUTO: NEGATIVE
PH UR STRIP: 7 [PH] (ref 5–8)
PROT UR STRIP-MCNC: NEGATIVE MG/DL
SERVICE CMNT-IMP: NORMAL
SP GR UR REFRACTOMETRY: <1.005 (ref 1–1.03)
SPECIMEN SOURCE: NORMAL
UROBILINOGEN UR QL STRIP.AUTO: 0.2 EU/DL (ref 0.2–1)
WET PREP GENITAL: NORMAL

## 2022-11-07 PROCEDURE — 99283 EMERGENCY DEPT VISIT LOW MDM: CPT

## 2022-11-07 PROCEDURE — 81003 URINALYSIS AUTO W/O SCOPE: CPT

## 2022-11-07 PROCEDURE — 87491 CHLMYD TRACH DNA AMP PROBE: CPT

## 2022-11-07 PROCEDURE — 87210 SMEAR WET MOUNT SALINE/INK: CPT

## 2022-11-07 NOTE — DISCHARGE INSTRUCTIONS
Please follow-up with primary care physician or referral physician next 24 to 48 hours. Call to schedule appointment.     Return to the emergency department for abdominal pain, fevers, inability tolerating by mouth, fevers, any other concerning symptoms

## 2022-11-07 NOTE — Clinical Note
[address] [facility] EMERGENCY DEPT  [id number] [facility] [id number] [phone]    Work/School Note    Date: 11/7/2022    To Whom It May concern:    Joaquina Wynn was seen and treated today in the emergency room by the following provider(s):  Attending Provider: Que Mendoza MD.      Joaquina Wynn is excused from work/school on 11/07/22 and 11/08/22. She is medically clear to return to work/school on 11/9/2022.        Sincerely,          Michael Elena MD

## 2022-11-07 NOTE — ED NOTES
During assessment, patient asked if any excoriations, bleeding, or discharge. Nurse did not visualize, MD evaluated. Patient resting comfortably on stretcher. NAD at this time.

## 2022-11-07 NOTE — ED TRIAGE NOTES
Pt c/o \"fleas in vagina\" pt states \"my dog went outside a month ago and got flea I know that I have them in my vagina now\" pt was seen by PCP 3 weeks ago but per pt didn't look at her vaginia. +mild odor, denies any concerns for STDs

## 2022-11-07 NOTE — ED PROVIDER NOTES
EMERGENCY DEPARTMENT HISTORY AND PHYSICAL EXAM    8:06 AM      Date: 11/7/2022  Patient Name: Michaele Fabry    History of Presenting Illness     Chief Complaint   Patient presents with    Foreign Body in Vagina         History Provided By: Patient  Location/Duration/Severity/Modifying factors   HPI  This is a very pleasant 82-year-old female brought to the emergency department for evaluation of concern for flea infestation. Patient reports being in usual state of health until several weeks ago. She says that her dog was outside and came back in with fleas. And she says that she has a sensation of flea bites on her body but tickly in her genital region. She says that she has been using homeopathic remedies. States that she has had vaginal odor which caused her to be concerned. States that she has not had any sexual activity in the last 18 months and has had a total hysterectomy last year. Denies any bleeding or trauma. No change in bowel or bladder habits no fevers and she says that she does have what appears to be bug bites on her arms and legs. PCP: Dom Oconnor NP    Current Outpatient Medications   Medication Sig Dispense Refill    ALPRAZolam (XANAX) 0.25 mg tablet TAKE 1 TABLET BY MOUTH ONCE DAILY AS NEEDED      amitriptyline (ELAVIL) 25 mg tablet TAKE 2 TABLETS BY MOUTH AT BEDTIME      ibuprofen (MOTRIN) 800 mg tablet Take 1 Tablet by mouth every eight (8) hours as needed for Pain. 30 Tablet 0    losartan (COZAAR) 50 mg tablet Take 50 mg by mouth daily. natalizumab (TYSABRI) 300 mg/15 mL injection Tysabri 300MG/15ML Intravenous Concentrate QTY: 1 vial Days: 30 Refills: 5  Written: 06/24/16 Patient Instructions: 300mg infused every 28 days: faxed see image      ondansetron (Zofran ODT) 4 mg disintegrating tablet Take 1-2 tablets every 6-8 hours as needed for nausea and vomiting.  (Patient not taking: Reported on 5/31/2022) 10 Tablet 0    tiZANidine (ZANAFLEX) 2 mg capsule Take 2 mg by mouth three (3) times daily. albuterol (PROVENTIL HFA, VENTOLIN HFA, PROAIR HFA) 90 mcg/actuation inhaler Take  by inhalation. diclofenac (VOLTAREN) 1 % gel Apply  to affected area four (4) times daily. METHYL SALICYLATE/MENTHOL (BENGAY EX) by Apply Externally route. (Patient not taking: Reported on 5/31/2022)      carBAMazepine (TEGRETOL) 100 mg chewable tablet       albuterol (ACCUNEB) 1.25 mg/3 mL nebulizer solution 1.25 mg. VENTOLIN HFA 90 mcg/actuation inhaler  (Patient not taking: Reported on 5/31/2022)  2    ergocalciferol (ERGOCALCIFEROL) 50,000 unit capsule 50,000 Units every seven (7) days.  Indications: VITAMIN D DEFICIENCY         Past History     Past Medical History:  Past Medical History:   Diagnosis Date    Anemia     Asthma     Asthma     Bilateral ovarian cysts     Cervical intraepithelial neoplasia grade 3     Depression     HA (headache)     Heart murmur     Hypertension     MS (multiple sclerosis) ([address]     Multiple sclerosis ([address]     Wheezing        Past Surgical History:  Past Surgical History:   Procedure Laterality Date    HX KNEE ARTHROSCOPY Left     x2    HX MOHS PROCEDURES Right     HX TUBAL LIGATION  08/17/1999    [place] ANESTH,SURGERY OF SHOULDER         Family History:  Family History   Problem Relation Age of Onset    Depression Mother     Cancer Mother         Lung    Ovarian Cancer Mother     Asthma Mother     Hypertension Mother     Diabetes Mother     Heart Failure Mother     Alzheimer's Disease Mother     Depression Father     Drug Abuse Father     Hypertension Father     Migraines Sister     Mult Sclerosis Sister     Hypertension Sister     Mult Sclerosis Sister     Hypertension Maternal Grandmother     High Cholesterol Maternal Grandmother     Diabetes Maternal Grandmother     Hypertension Maternal Grandfather     High Cholesterol Maternal Grandfather     Diabetes Maternal Grandfather     Hypertension Paternal Grandmother     High Cholesterol Paternal Grandmother Diabetes Paternal Grandmother     Hypertension Paternal Grandfather     High Cholesterol Paternal Grandfather     Heart Failure Paternal Grandfather     Diabetes Paternal Grandfather     Hypertension Sister     Diabetes Sister     Mult Sclerosis Sister        Social History:  Social History     Tobacco Use    Smoking status: Every Day     Packs/day: 0.25     Years: 15.00     Pack years: 3.75     Types: Cigarettes     Start date: 1/1/1992    Smokeless tobacco: Never   Substance Use Topics    Alcohol use: Yes     Alcohol/week: 3.0 - 4.0 standard drinks     Types: 3 - 4 Glasses of wine per week     Comment: 3 to 5 glasses wine weekly    Drug use: No       Allergies: Allergies   Allergen Reactions    Other Plant, Animal, Environmental Hives     Hair dye    Penicillins Swelling     Other reaction(s): anaphylaxis/angioedema, wheezing/sob         Review of Systems       Review of Systems  At least 10 systems reviewed and otherwise acutely negative except as in the [address] Physical Exam   Visit Vitals  BP (!) 137/97 (BP 1 Location: Left arm, BP Patient Position: At rest;Sitting)   Pulse (!) 110   Temp 98.1 °F (36.7 °C)   Resp 16   Ht 5' (1.524 m)   Wt 81.6 kg (180 lb)   LMP 05/27/2022   SpO2 100%   BMI 35.15 kg/m²         Physical Exam (physical exam performed with female nurse present during the entire time.)  General: Well-appearing well-nourished female in no acute distress  HEENT: Pupils equal round reactive light, moist mucous membranes. Extraocular movements intact  Neck: Supple, no meningismus  Chest: Regular rate rhythm no murmurs rubs or gallops  Abdomen: Soft nontender nondistended no tenderness in right upper quadrant no pulsatile masses to deep palpation  : Enrique stage V female. No lesions or vaginal discharge noted.   Lungs: Clear to auscultation bilaterally no wheeze rales rhonchi or stridor  Extremities: No unilateral leg swelling no obvious deformities or dislocations  Integument: No apparent rashes erythema contusions or petechiae  Neurologic: Cranial nerves II through XII grossly intact  Psychiatric: Alert oriented x3    Diagnostic Study Results     Labs -  Recent Results (from the past 12 hour(s))   URINALYSIS W/ RFLX MICROSCOPIC    Collection Time: 11/07/22  8:01 AM   Result Value Ref Range    Color YELLOW      Appearance CLEAR      Specific gravity <1.005 (L) 1.005 - 1.030    pH (UA) 7.0 5.0 - 8.0      Protein Negative NEG mg/dL    Glucose Negative NEG mg/dL    Ketone Negative NEG mg/dL    Bilirubin Negative NEG      Blood Negative NEG      Urobilinogen 0.2 0.2 - 1.0 EU/dL    Nitrites Negative NEG      Leukocyte Esterase Negative NEG     WET PREP    Collection Time: 11/07/22  8:01 AM    Specimen: Vaginal Specimen   Result Value Ref Range    Special Requests: NO SPECIAL REQUESTS      Wet prep NO YEAST,TRICHOMONAS OR CLUE CELLS NOTED         Radiologic Studies -   No orders to display         Medical Decision Making   I am the first provider for this patient. I reviewed the vital signs, available nursing notes, past medical history, past surgical history, family history and social history. Vital Signs-Reviewed the patient's vital signs. EKG:       Records Reviewed: Nursing Notes and Old Medical Records (Time of Review: 8:06 AM)    ED Course: Progress Notes, Reevaluation, and Consults:         Provider Notes (Medical Decision Making):   MDM  Very pleasant 63-year-old female brought to the emergency department for evaluation of concern for possible flea infestation. Based on patient's history and physical and symptoms consistent with insect bites or stings. I did not appreciate any perigenital lesions or vaginal discharge. Low clinical suspicion for acute foreign body or insect within the vaginal cavity. Patient denies any sensation of foreign body or movement inside of her. Of the possibility patient symptoms to include infectious etiology or bacterial vaginosis.   Patient is clinically well-appearing in no acute distress or discomfort. I did review patient's laboratory work. Digital believe any imaging studies is necessary at this time. Discussed the findings with the patient. Recommend close follow-up with primary care physician or referral physician next 24 to 48 hours. Return precautions were discussed with her including but not limited to abdominal pain, fevers, changes in bowel or bladder habits or any other concerning symptoms. Patient did express a verbal understanding of these instructions and does feel comfortable to be discharged home. Procedures    Critical Care Time:         Diagnosis     Clinical Impression:   1. Insect bite of female external genital organ, unspecified organ, initial encounter        Disposition:   Home    Follow-up Information       Follow up With Specialties Details Why Contact Sameera Carpio NP Nurse Practitioner Schedule an appointment as soon as possible for a visit in 2 days  [address] [place] [address]  [phone]               Patient's Medications   Start Taking    No medications on file   Continue Taking    ALBUTEROL (ACCUNEB) 1.25 MG/3 ML NEBULIZER SOLUTION    1.25 mg. ALBUTEROL (PROVENTIL HFA, VENTOLIN HFA, PROAIR HFA) 90 MCG/ACTUATION INHALER    Take  by inhalation. ALPRAZOLAM (XANAX) 0.25 MG TABLET    TAKE 1 TABLET BY MOUTH ONCE DAILY AS NEEDED    AMITRIPTYLINE (ELAVIL) 25 MG TABLET    TAKE 2 TABLETS BY MOUTH AT BEDTIME    CARBAMAZEPINE (TEGRETOL) 100 MG CHEWABLE TABLET        DICLOFENAC (VOLTAREN) 1 % GEL    Apply  to affected area four (4) times daily. ERGOCALCIFEROL (ERGOCALCIFEROL) 50,000 UNIT CAPSULE    50,000 Units every seven (7) days. Indications: VITAMIN D DEFICIENCY    IBUPROFEN (MOTRIN) 800 MG TABLET    Take 1 Tablet by mouth every eight (8) hours as needed for Pain. LOSARTAN (COZAAR) 50 MG TABLET    Take 50 mg by mouth daily.     METHYL SALICYLATE/MENTHOL (BENGAY EX)    by Apply Externally route. NATALIZUMAB (TYSABRI) 300 MG/15 ML INJECTION    Tysabri 300MG/15ML Intravenous Concentrate QTY: 1 vial Days: 30 Refills: 5  Written: 06/24/16 Patient Instructions: 300mg infused every 28 days: faxed see image    ONDANSETRON (ZOFRAN ODT) 4 MG DISINTEGRATING TABLET    Take 1-2 tablets every 6-8 hours as needed for nausea and vomiting. TIZANIDINE (ZANAFLEX) 2 MG CAPSULE    Take 2 mg by mouth three (3) times daily. VENTOLIN HFA 90 MCG/ACTUATION INHALER       These Medications have changed    No medications on file   Stop Taking    No medications on file     Disclaimer: Sections of this note are dictated using utilizing voice recognition software. Minor typographical errors may be present. If questions arise, please do not hesitate to contact me or call our department.

## 2022-11-07 NOTE — Clinical Note
[address] [facility] EMERGENCY DEPT  [id number] [facility] [id number] [phone]    Work/School Note    Date: 11/7/2022    To Whom It May concern:    Palmer Reyes was seen and treated today in the emergency room by the following provider(s):  Attending Provider: Carmen Estrada MD.      Palmer Reyes is excused from work/school on 11/07/22 and 11/08/22. She is medically clear to return to work/school on 11/9/2022.        Sincerely,          Ange Barber MD